# Patient Record
Sex: FEMALE | Race: WHITE | Employment: OTHER | URBAN - NONMETROPOLITAN AREA
[De-identification: names, ages, dates, MRNs, and addresses within clinical notes are randomized per-mention and may not be internally consistent; named-entity substitution may affect disease eponyms.]

---

## 2017-01-30 ENCOUNTER — DOCTOR'S OFFICE (OUTPATIENT)
Dept: URBAN - NONMETROPOLITAN AREA CLINIC 1 | Facility: CLINIC | Age: 67
Setting detail: OPHTHALMOLOGY
End: 2017-01-30
Payer: MEDICARE

## 2017-01-30 DIAGNOSIS — L71.8: ICD-10-CM

## 2017-01-30 DIAGNOSIS — H43.813: ICD-10-CM

## 2017-01-30 DIAGNOSIS — H01.004: ICD-10-CM

## 2017-01-30 DIAGNOSIS — H35.033: ICD-10-CM

## 2017-01-30 DIAGNOSIS — H01.005: ICD-10-CM

## 2017-01-30 DIAGNOSIS — H04.123: ICD-10-CM

## 2017-01-30 DIAGNOSIS — H01.002: ICD-10-CM

## 2017-01-30 DIAGNOSIS — H25.13: ICD-10-CM

## 2017-01-30 DIAGNOSIS — H01.001: ICD-10-CM

## 2017-01-30 DIAGNOSIS — E11.9: ICD-10-CM

## 2017-01-30 PROCEDURE — 92014 COMPRE OPH EXAM EST PT 1/>: CPT | Performed by: OPHTHALMOLOGY

## 2017-01-30 ASSESSMENT — REFRACTION_MANIFEST
OS_VA1: 20/
OD_VA1: 20/
OD_VA2: 20/
OS_VA2: 20/
OU_VA: 20/
OS_VA3: 20/
OD_VA1: 20/
OD_VA3: 20/
OU_VA: 20/
OS_VA1: 20/
OS_VA2: 20/
OD_VA3: 20/
OD_VA2: 20/
OS_VA2: 20/
OS_VA1: 20/
OD_VA1: 20/
OD_VA2: 20/
OS_VA3: 20/
OD_VA3: 20/
OS_VA3: 20/
OU_VA: 20/

## 2017-01-30 ASSESSMENT — REFRACTION_CURRENTRX
OS_CYLINDER: -0.75
OS_ADD: +2.50
OD_SPHERE: +1.50
OD_CYLINDER: -0.25
OD_OVR_VA: 20/
OS_OVR_VA: 20/
OS_AXIS: 4
OS_SPHERE: +1.00
OD_AXIS: 146
OD_ADD: +2.50
OS_VPRISM_DIRECTION: PROGS
OD_VPRISM_DIRECTION: PROGS
OS_OVR_VA: 20/
OS_OVR_VA: 20/

## 2017-01-30 ASSESSMENT — REFRACTION_AUTOREFRACTION
OS_CYLINDER: -1.00
OS_AXIS: 15
OD_SPHERE: +0.75
OS_SPHERE: +1.25
OD_CYLINDER: 0.00
OD_AXIS: 180

## 2017-01-30 ASSESSMENT — SUPERFICIAL PUNCTATE KERATITIS (SPK)
OD_SPK: 1+
OS_SPK: 1+

## 2017-01-30 ASSESSMENT — LID EXAM ASSESSMENTS
OD_BLEPHARITIS: T
OS_BLEPHARITIS: T
OS_MEIBOMITIS: 1+
OD_MEIBOMITIS: 1+

## 2017-01-30 ASSESSMENT — SPHEQUIV_DERIVED
OS_SPHEQUIV: 0.75
OD_SPHEQUIV: 0.75

## 2017-01-30 ASSESSMENT — VISUAL ACUITY
OS_BCVA: 20/30+2
OD_BCVA: 20/30

## 2017-01-30 ASSESSMENT — CONFRONTATIONAL VISUAL FIELD TEST (CVF)
OD_FINDINGS: FULL
OS_FINDINGS: FULL

## 2018-01-29 ENCOUNTER — DOCTOR'S OFFICE (OUTPATIENT)
Dept: URBAN - NONMETROPOLITAN AREA CLINIC 1 | Facility: CLINIC | Age: 68
Setting detail: OPHTHALMOLOGY
End: 2018-01-29
Payer: MEDICARE

## 2018-01-29 DIAGNOSIS — H04.122: ICD-10-CM

## 2018-01-29 DIAGNOSIS — H04.121: ICD-10-CM

## 2018-01-29 DIAGNOSIS — H43.811: ICD-10-CM

## 2018-01-29 DIAGNOSIS — H35.033: ICD-10-CM

## 2018-01-29 DIAGNOSIS — H43.812: ICD-10-CM

## 2018-01-29 DIAGNOSIS — H43.813: ICD-10-CM

## 2018-01-29 DIAGNOSIS — H04.123: ICD-10-CM

## 2018-01-29 DIAGNOSIS — H25.13: ICD-10-CM

## 2018-01-29 DIAGNOSIS — L71.8: ICD-10-CM

## 2018-01-29 PROCEDURE — 92014 COMPRE OPH EXAM EST PT 1/>: CPT | Performed by: OPHTHALMOLOGY

## 2018-01-29 PROCEDURE — 92225 OPHTHALMOSCOPY EXTENDED INITIAL: CPT | Performed by: OPHTHALMOLOGY

## 2018-01-29 ASSESSMENT — REFRACTION_CURRENTRX
OD_OVR_VA: 20/
OS_AXIS: 4
OS_OVR_VA: 20/
OS_OVR_VA: 20/
OD_OVR_VA: 20/
OS_VPRISM_DIRECTION: PROGS
OS_OVR_VA: 20/
OD_OVR_VA: 20/
OS_SPHERE: +1.00
OD_CYLINDER: -0.25
OD_VPRISM_DIRECTION: PROGS
OS_CYLINDER: -0.75
OS_ADD: +2.50
OD_ADD: +2.50
OD_SPHERE: +1.50
OD_AXIS: 146

## 2018-01-29 ASSESSMENT — REFRACTION_MANIFEST
OS_VA2: 20/
OU_VA: 20/
OS_VA3: 20/
OD_VA3: 20/
OD_VA2: 20/
OU_VA: 20/
OD_VA3: 20/
OS_VA1: 20/
OD_VA1: 20/
OS_VA2: 20/
OD_VA3: 20/
OD_VA1: 20/
OS_VA3: 20/
OU_VA: 20/
OS_VA1: 20/
OD_VA1: 20/
OS_VA3: 20/
OS_VA2: 20/
OD_VA2: 20/
OS_VA1: 20/
OD_VA2: 20/

## 2018-01-29 ASSESSMENT — REFRACTION_AUTOREFRACTION
OD_CYLINDER: 0.00
OD_SPHERE: +0.75
OS_CYLINDER: -1.00
OD_AXIS: 180
OS_AXIS: 15
OS_SPHERE: +1.25

## 2018-01-29 ASSESSMENT — CONFRONTATIONAL VISUAL FIELD TEST (CVF)
OD_FINDINGS: FULL
OS_FINDINGS: FULL

## 2018-01-29 ASSESSMENT — SUPERFICIAL PUNCTATE KERATITIS (SPK)
OS_SPK: ABSENT
OD_SPK: ABSENT

## 2018-01-29 ASSESSMENT — LID EXAM ASSESSMENTS
OD_MEIBOMITIS: 1+
OS_MEIBOMITIS: 1+
OD_BLEPHARITIS: T
OS_BLEPHARITIS: T

## 2018-01-29 ASSESSMENT — SPHEQUIV_DERIVED
OS_SPHEQUIV: 0.75
OD_SPHEQUIV: 0.75

## 2018-01-29 ASSESSMENT — VISUAL ACUITY
OS_BCVA: 20/25+2
OD_BCVA: 20/25-1

## 2019-01-21 ENCOUNTER — DOCTOR'S OFFICE (OUTPATIENT)
Dept: URBAN - NONMETROPOLITAN AREA CLINIC 1 | Facility: CLINIC | Age: 69
Setting detail: OPHTHALMOLOGY
End: 2019-01-21
Payer: MEDICARE

## 2019-01-21 DIAGNOSIS — H04.123: ICD-10-CM

## 2019-01-21 DIAGNOSIS — H43.813: ICD-10-CM

## 2019-01-21 DIAGNOSIS — H25.13: ICD-10-CM

## 2019-01-21 DIAGNOSIS — E11.9: ICD-10-CM

## 2019-01-21 DIAGNOSIS — L71.8: ICD-10-CM

## 2019-01-21 DIAGNOSIS — H04.122: ICD-10-CM

## 2019-01-21 PROCEDURE — 83861 MICROFLUID ANALY TEARS: CPT | Performed by: OPHTHALMOLOGY

## 2019-01-21 PROCEDURE — 92014 COMPRE OPH EXAM EST PT 1/>: CPT | Performed by: OPHTHALMOLOGY

## 2019-01-21 PROCEDURE — 92134 CPTRZ OPH DX IMG PST SGM RTA: CPT | Performed by: OPHTHALMOLOGY

## 2019-01-21 ASSESSMENT — LID EXAM ASSESSMENTS
OS_MEIBOMITIS: 1+
OD_MEIBOMITIS: 1+
OS_BLEPHARITIS: T
OD_BLEPHARITIS: T

## 2019-01-21 ASSESSMENT — CONFRONTATIONAL VISUAL FIELD TEST (CVF)
OS_FINDINGS: FULL
OD_FINDINGS: FULL

## 2019-01-21 ASSESSMENT — REFRACTION_AUTOREFRACTION
OS_SPHERE: +1.25
OD_AXIS: 180
OD_CYLINDER: 0.00
OS_AXIS: 15
OD_SPHERE: +0.75
OS_CYLINDER: -1.00

## 2019-01-21 ASSESSMENT — VISUAL ACUITY
OD_BCVA: 20/25+1
OS_BCVA: 20/20-1

## 2019-01-21 ASSESSMENT — REFRACTION_CURRENTRX
OS_OVR_VA: 20/
OD_VPRISM_DIRECTION: PROGS
OD_CYLINDER: -0.25
OS_ADD: +2.50
OS_VPRISM_DIRECTION: PROGS
OS_SPHERE: +1.00
OD_SPHERE: +1.50
OS_OVR_VA: 20/
OD_AXIS: 146
OS_OVR_VA: 20/
OS_AXIS: 4
OD_OVR_VA: 20/
OS_CYLINDER: -0.75
OD_OVR_VA: 20/
OD_OVR_VA: 20/
OD_ADD: +2.50

## 2019-01-21 ASSESSMENT — REFRACTION_MANIFEST
OU_VA: 20/
OS_VA1: 20/
OD_VA1: 20/
OU_VA: 20/
OS_VA3: 20/
OS_VA3: 20/
OD_VA2: 20/
OS_VA2: 20/
OD_VA3: 20/
OD_VA2: 20/
OD_VA1: 20/
OD_VA3: 20/
OS_VA2: 20/
OS_VA1: 20/

## 2019-01-21 ASSESSMENT — SPHEQUIV_DERIVED
OS_SPHEQUIV: 0.75
OD_SPHEQUIV: 0.75

## 2019-01-21 ASSESSMENT — SUPERFICIAL PUNCTATE KERATITIS (SPK)
OD_SPK: ABSENT
OS_SPK: ABSENT

## 2019-02-21 ENCOUNTER — DOCTOR'S OFFICE (OUTPATIENT)
Dept: URBAN - NONMETROPOLITAN AREA CLINIC 1 | Facility: CLINIC | Age: 69
Setting detail: OPHTHALMOLOGY
End: 2019-02-21
Payer: MEDICARE

## 2019-02-21 DIAGNOSIS — H10.13: ICD-10-CM

## 2019-02-21 DIAGNOSIS — L71.8: ICD-10-CM

## 2019-02-21 DIAGNOSIS — H04.123: ICD-10-CM

## 2019-02-21 DIAGNOSIS — H04.122: ICD-10-CM

## 2019-02-21 PROCEDURE — 83861 MICROFLUID ANALY TEARS: CPT | Performed by: OPHTHALMOLOGY

## 2019-02-21 PROCEDURE — 92012 INTRM OPH EXAM EST PATIENT: CPT | Performed by: OPHTHALMOLOGY

## 2019-02-21 ASSESSMENT — REFRACTION_CURRENTRX
OD_OVR_VA: 20/
OD_AXIS: 146
OS_ADD: +2.50
OD_ADD: +2.50
OD_CYLINDER: -0.25
OS_SPHERE: +1.00
OS_OVR_VA: 20/
OD_OVR_VA: 20/
OS_VPRISM_DIRECTION: PROGS
OD_SPHERE: +1.50
OS_AXIS: 4
OS_OVR_VA: 20/
OS_CYLINDER: -0.75
OS_OVR_VA: 20/
OD_VPRISM_DIRECTION: PROGS
OD_OVR_VA: 20/

## 2019-02-21 ASSESSMENT — REFRACTION_AUTOREFRACTION
OS_CYLINDER: -1.00
OD_AXIS: 180
OD_CYLINDER: 0.00
OD_SPHERE: +0.75
OS_SPHERE: +1.25
OS_AXIS: 15

## 2019-02-21 ASSESSMENT — LID EXAM ASSESSMENTS
OS_MEIBOMITIS: 1+
OS_BLEPHARITIS: T
OD_MEIBOMITIS: 1+
OD_BLEPHARITIS: T

## 2019-02-21 ASSESSMENT — REFRACTION_MANIFEST
OD_VA3: 20/
OU_VA: 20/
OS_VA3: 20/
OU_VA: 20/
OD_VA2: 20/
OD_VA1: 20/
OS_VA1: 20/
OD_VA3: 20/
OS_VA2: 20/
OS_VA1: 20/
OD_VA2: 20/
OS_VA2: 20/
OS_VA3: 20/
OD_VA1: 20/

## 2019-02-21 ASSESSMENT — CONFRONTATIONAL VISUAL FIELD TEST (CVF)
OS_FINDINGS: FULL
OD_FINDINGS: FULL

## 2019-02-21 ASSESSMENT — SUPERFICIAL PUNCTATE KERATITIS (SPK)
OS_SPK: ABSENT
OD_SPK: ABSENT

## 2019-02-21 ASSESSMENT — VISUAL ACUITY
OD_BCVA: 20/25-2
OS_BCVA: 20/20-1

## 2019-02-21 ASSESSMENT — SPHEQUIV_DERIVED
OS_SPHEQUIV: 0.75
OD_SPHEQUIV: 0.75

## 2019-03-19 ENCOUNTER — DOCTOR'S OFFICE (OUTPATIENT)
Dept: URBAN - NONMETROPOLITAN AREA CLINIC 1 | Facility: CLINIC | Age: 69
Setting detail: OPHTHALMOLOGY
End: 2019-03-19
Payer: MEDICARE

## 2019-03-19 DIAGNOSIS — H10.13: ICD-10-CM

## 2019-03-19 PROCEDURE — 95004 PERQ TESTS W/ALRGNC XTRCS: CPT | Performed by: OPHTHALMOLOGY

## 2019-03-28 ENCOUNTER — DOCTOR'S OFFICE (OUTPATIENT)
Dept: URBAN - NONMETROPOLITAN AREA CLINIC 1 | Facility: CLINIC | Age: 69
Setting detail: OPHTHALMOLOGY
End: 2019-03-28
Payer: MEDICARE

## 2019-03-28 DIAGNOSIS — H43.813: ICD-10-CM

## 2019-03-28 DIAGNOSIS — H04.123: ICD-10-CM

## 2019-03-28 DIAGNOSIS — H35.033: ICD-10-CM

## 2019-03-28 DIAGNOSIS — H25.13: ICD-10-CM

## 2019-03-28 DIAGNOSIS — E11.9: ICD-10-CM

## 2019-03-28 DIAGNOSIS — H04.122: ICD-10-CM

## 2019-03-28 PROCEDURE — 92014 COMPRE OPH EXAM EST PT 1/>: CPT | Performed by: OPHTHALMOLOGY

## 2019-03-28 PROCEDURE — 92134 CPTRZ OPH DX IMG PST SGM RTA: CPT | Performed by: OPHTHALMOLOGY

## 2019-03-28 PROCEDURE — 83861 MICROFLUID ANALY TEARS: CPT | Performed by: OPHTHALMOLOGY

## 2019-03-28 ASSESSMENT — REFRACTION_MANIFEST
OS_VA2: 20/
OD_VA2: 20/
OS_VA3: 20/
OD_VA3: 20/
OD_VA3: 20/
OS_VA1: 20/
OD_VA2: 20/
OS_VA1: 20/
OS_VA2: 20/
OS_VA3: 20/
OU_VA: 20/
OD_VA1: 20/
OD_VA1: 20/
OU_VA: 20/

## 2019-03-28 ASSESSMENT — LID EXAM ASSESSMENTS
OD_MEIBOMITIS: 1+
OS_MEIBOMITIS: 1+
OS_BLEPHARITIS: T
OD_BLEPHARITIS: T

## 2019-03-28 ASSESSMENT — REFRACTION_CURRENTRX
OD_AXIS: 146
OD_CYLINDER: -0.25
OS_AXIS: 4
OD_OVR_VA: 20/
OD_OVR_VA: 20/
OD_ADD: +2.50
OD_SPHERE: +1.50
OS_SPHERE: +1.00
OS_VPRISM_DIRECTION: PROGS
OD_VPRISM_DIRECTION: PROGS
OD_OVR_VA: 20/
OS_CYLINDER: -0.75
OS_OVR_VA: 20/
OS_OVR_VA: 20/
OS_ADD: +2.50
OS_OVR_VA: 20/

## 2019-03-28 ASSESSMENT — REFRACTION_AUTOREFRACTION
OD_AXIS: 180
OS_SPHERE: +1.25
OD_CYLINDER: 0.00
OS_CYLINDER: -1.00
OS_AXIS: 15
OD_SPHERE: +0.75

## 2019-03-28 ASSESSMENT — VISUAL ACUITY
OS_BCVA: 20/20-1
OD_BCVA: 20/25-2

## 2019-03-28 ASSESSMENT — SUPERFICIAL PUNCTATE KERATITIS (SPK)
OS_SPK: ABSENT
OD_SPK: ABSENT

## 2019-03-28 ASSESSMENT — CONFRONTATIONAL VISUAL FIELD TEST (CVF)
OS_FINDINGS: FULL
OD_FINDINGS: FULL

## 2019-03-28 ASSESSMENT — SPHEQUIV_DERIVED
OS_SPHEQUIV: 0.75
OD_SPHEQUIV: 0.75

## 2019-05-23 ENCOUNTER — DOCTOR'S OFFICE (OUTPATIENT)
Dept: URBAN - NONMETROPOLITAN AREA CLINIC 1 | Facility: CLINIC | Age: 69
Setting detail: OPHTHALMOLOGY
End: 2019-05-23
Payer: MEDICARE

## 2019-05-23 DIAGNOSIS — H43.811: ICD-10-CM

## 2019-05-23 DIAGNOSIS — H35.033: ICD-10-CM

## 2019-05-23 DIAGNOSIS — H43.812: ICD-10-CM

## 2019-05-23 DIAGNOSIS — H43.813: ICD-10-CM

## 2019-05-23 DIAGNOSIS — H25.13: ICD-10-CM

## 2019-05-23 DIAGNOSIS — H04.122: ICD-10-CM

## 2019-05-23 DIAGNOSIS — H04.121: ICD-10-CM

## 2019-05-23 DIAGNOSIS — H04.123: ICD-10-CM

## 2019-05-23 PROCEDURE — 92134 CPTRZ OPH DX IMG PST SGM RTA: CPT | Performed by: OPHTHALMOLOGY

## 2019-05-23 PROCEDURE — 83861 MICROFLUID ANALY TEARS: CPT | Performed by: OPHTHALMOLOGY

## 2019-05-23 PROCEDURE — 92226 OPHTHALMOSCOPY EXT SUBSEQUENT: CPT | Performed by: OPHTHALMOLOGY

## 2019-05-23 PROCEDURE — 92014 COMPRE OPH EXAM EST PT 1/>: CPT | Performed by: OPHTHALMOLOGY

## 2019-05-23 ASSESSMENT — REFRACTION_MANIFEST
OD_VA3: 20/
OS_VA3: 20/
OS_VA1: 20/
OD_VA2: 20/
OD_VA1: 20/
OD_VA1: 20/
OS_VA1: 20/
OS_VA2: 20/
OS_VA2: 20/
OS_VA3: 20/
OD_VA3: 20/
OU_VA: 20/
OD_VA2: 20/
OU_VA: 20/

## 2019-05-23 ASSESSMENT — REFRACTION_CURRENTRX
OS_OVR_VA: 20/
OD_OVR_VA: 20/
OS_SPHERE: +1.00
OD_OVR_VA: 20/
OD_CYLINDER: -0.25
OD_ADD: +2.50
OD_VPRISM_DIRECTION: PROGS
OS_OVR_VA: 20/
OS_AXIS: 4
OD_AXIS: 146
OD_SPHERE: +1.50
OS_CYLINDER: -0.75
OS_VPRISM_DIRECTION: PROGS
OD_OVR_VA: 20/
OS_OVR_VA: 20/
OS_ADD: +2.50

## 2019-05-23 ASSESSMENT — SUPERFICIAL PUNCTATE KERATITIS (SPK)
OD_SPK: ABSENT
OS_SPK: ABSENT

## 2019-05-23 ASSESSMENT — REFRACTION_AUTOREFRACTION
OS_CYLINDER: -1.00
OS_SPHERE: +1.25
OS_AXIS: 15
OD_SPHERE: +0.75
OD_CYLINDER: 0.00
OD_AXIS: 180

## 2019-05-23 ASSESSMENT — LID EXAM ASSESSMENTS
OS_MEIBOMITIS: 1+
OD_MEIBOMITIS: 1+
OD_BLEPHARITIS: T
OS_BLEPHARITIS: T

## 2019-05-23 ASSESSMENT — CONFRONTATIONAL VISUAL FIELD TEST (CVF)
OS_FINDINGS: FULL
OD_FINDINGS: FULL

## 2019-05-23 ASSESSMENT — VISUAL ACUITY
OS_BCVA: 20/20-1
OD_BCVA: 20/25

## 2019-05-23 ASSESSMENT — SPHEQUIV_DERIVED
OS_SPHEQUIV: 0.75
OD_SPHEQUIV: 0.75

## 2019-09-27 ENCOUNTER — DOCTOR'S OFFICE (OUTPATIENT)
Dept: URBAN - NONMETROPOLITAN AREA CLINIC 1 | Facility: CLINIC | Age: 69
Setting detail: OPHTHALMOLOGY
End: 2019-09-27
Payer: MEDICARE

## 2019-09-27 DIAGNOSIS — E11.9: ICD-10-CM

## 2019-09-27 DIAGNOSIS — H25.13: ICD-10-CM

## 2019-09-27 DIAGNOSIS — H04.122: ICD-10-CM

## 2019-09-27 DIAGNOSIS — H35.033: ICD-10-CM

## 2019-09-27 DIAGNOSIS — H04.123: ICD-10-CM

## 2019-09-27 PROCEDURE — 92012 INTRM OPH EXAM EST PATIENT: CPT | Performed by: OPHTHALMOLOGY

## 2019-09-27 PROCEDURE — 83861 MICROFLUID ANALY TEARS: CPT | Performed by: OPHTHALMOLOGY

## 2019-09-27 ASSESSMENT — REFRACTION_CURRENTRX
OD_CYLINDER: -0.25
OS_OVR_VA: 20/
OS_OVR_VA: 20/
OD_OVR_VA: 20/
OS_CYLINDER: -0.75
OS_AXIS: 4
OS_SPHERE: +1.00
OD_ADD: +2.50
OS_VPRISM_DIRECTION: PROGS
OS_OVR_VA: 20/
OD_AXIS: 146
OD_SPHERE: +1.50
OD_OVR_VA: 20/
OD_OVR_VA: 20/
OS_ADD: +2.50
OD_VPRISM_DIRECTION: PROGS

## 2019-09-27 ASSESSMENT — SPHEQUIV_DERIVED
OS_SPHEQUIV: 0.625
OD_SPHEQUIV: 0.875

## 2019-09-27 ASSESSMENT — SUPERFICIAL PUNCTATE KERATITIS (SPK)
OS_SPK: ABSENT
OD_SPK: ABSENT

## 2019-09-27 ASSESSMENT — REFRACTION_AUTOREFRACTION
OS_SPHERE: +1.25
OD_SPHERE: +1.00
OS_CYLINDER: -1.25
OD_CYLINDER: -0.25
OD_AXIS: 1
OS_AXIS: 8

## 2019-09-27 ASSESSMENT — REFRACTION_MANIFEST
OS_VA2: 20/
OD_VA2: 20/
OS_VA2: 20/
OS_VA3: 20/
OD_VA1: 20/
OD_VA3: 20/
OU_VA: 20/
OD_VA1: 20/
OS_VA1: 20/
OD_VA2: 20/
OD_VA3: 20/
OS_VA1: 20/
OU_VA: 20/
OS_VA3: 20/

## 2019-09-27 ASSESSMENT — LID EXAM ASSESSMENTS
OS_BLEPHARITIS: T
OD_MEIBOMITIS: 1+
OS_MEIBOMITIS: 1+
OD_BLEPHARITIS: T

## 2019-09-27 ASSESSMENT — CONFRONTATIONAL VISUAL FIELD TEST (CVF)
OD_FINDINGS: FULL
OS_FINDINGS: FULL

## 2019-09-27 ASSESSMENT — VISUAL ACUITY
OS_BCVA: 20/20
OD_BCVA: 20/25+2

## 2020-06-26 ENCOUNTER — DOCTOR'S OFFICE (OUTPATIENT)
Dept: URBAN - NONMETROPOLITAN AREA CLINIC 1 | Facility: CLINIC | Age: 70
Setting detail: OPHTHALMOLOGY
End: 2020-06-26
Payer: MEDICARE

## 2020-06-26 DIAGNOSIS — H43.813: ICD-10-CM

## 2020-06-26 DIAGNOSIS — H04.121: ICD-10-CM

## 2020-06-26 DIAGNOSIS — H25.13: ICD-10-CM

## 2020-06-26 DIAGNOSIS — H04.122: ICD-10-CM

## 2020-06-26 DIAGNOSIS — H35.033: ICD-10-CM

## 2020-06-26 DIAGNOSIS — E11.9: ICD-10-CM

## 2020-06-26 PROCEDURE — 92014 COMPRE OPH EXAM EST PT 1/>: CPT | Performed by: OPHTHALMOLOGY

## 2020-06-26 PROCEDURE — 92134 CPTRZ OPH DX IMG PST SGM RTA: CPT | Performed by: OPHTHALMOLOGY

## 2020-06-26 PROCEDURE — 83861 MICROFLUID ANALY TEARS: CPT | Performed by: OPHTHALMOLOGY

## 2020-06-26 ASSESSMENT — REFRACTION_AUTOREFRACTION
OS_SPHERE: +2.00
OD_SPHERE: +1.25
OD_CYLINDER: 0.00
OS_AXIS: 019
OS_CYLINDER: -1.75

## 2020-06-26 ASSESSMENT — REFRACTION_CURRENTRX
OS_SPHERE: +1.00
OS_AXIS: 4
OD_AXIS: 146
OD_VPRISM_DIRECTION: PROGS
OS_OVR_VA: 20/
OS_CYLINDER: -0.75
OS_ADD: +2.50
OS_VPRISM_DIRECTION: PROGS
OD_OVR_VA: 20/
OD_CYLINDER: -0.25
OD_ADD: +2.50
OD_SPHERE: +1.50

## 2020-06-26 ASSESSMENT — CONFRONTATIONAL VISUAL FIELD TEST (CVF)
OS_FINDINGS: FULL
OD_FINDINGS: FULL

## 2020-06-26 ASSESSMENT — VISUAL ACUITY
OD_BCVA: 20/30-1
OS_BCVA: 20/25-1

## 2020-06-26 ASSESSMENT — SPHEQUIV_DERIVED
OD_SPHEQUIV: 1.25
OS_SPHEQUIV: 1.125

## 2020-06-26 ASSESSMENT — SUPERFICIAL PUNCTATE KERATITIS (SPK)
OS_SPK: 1+
OD_SPK: 1+

## 2021-04-08 DIAGNOSIS — Z23 ENCOUNTER FOR IMMUNIZATION: ICD-10-CM

## 2021-08-27 ENCOUNTER — DOCTOR'S OFFICE (OUTPATIENT)
Dept: URBAN - NONMETROPOLITAN AREA CLINIC 1 | Facility: CLINIC | Age: 71
Setting detail: OPHTHALMOLOGY
End: 2021-08-27
Payer: MEDICARE

## 2021-08-27 DIAGNOSIS — H04.122: ICD-10-CM

## 2021-08-27 DIAGNOSIS — H25.13: ICD-10-CM

## 2021-08-27 DIAGNOSIS — E11.9: ICD-10-CM

## 2021-08-27 DIAGNOSIS — H43.813: ICD-10-CM

## 2021-08-27 DIAGNOSIS — H04.123: ICD-10-CM

## 2021-08-27 DIAGNOSIS — H35.033: ICD-10-CM

## 2021-08-27 PROBLEM — H01.004 BLEPHARITIS; RIGHT UPPER LID, LEFT UPPER LID: Status: ACTIVE | Noted: 2018-01-29

## 2021-08-27 PROBLEM — L71.8: Status: ACTIVE | Noted: 2017-01-30

## 2021-08-27 PROBLEM — H10.13 ALLERGIC CONJUNCTIVITS; BOTH EYES: Status: ACTIVE | Noted: 2019-02-21

## 2021-08-27 PROBLEM — H04.121 DRY EYE; RIGHT EYE, LEFT EYE: Status: ACTIVE | Noted: 2018-01-29

## 2021-08-27 PROBLEM — H01.001 BLEPHARITIS; RIGHT UPPER LID, LEFT UPPER LID: Status: ACTIVE | Noted: 2018-01-29

## 2021-08-27 PROCEDURE — 83861 MICROFLUID ANALY TEARS: CPT | Performed by: OPHTHALMOLOGY

## 2021-08-27 PROCEDURE — 92134 CPTRZ OPH DX IMG PST SGM RTA: CPT | Performed by: OPHTHALMOLOGY

## 2021-08-27 PROCEDURE — 92014 COMPRE OPH EXAM EST PT 1/>: CPT | Performed by: OPHTHALMOLOGY

## 2021-08-27 ASSESSMENT — REFRACTION_AUTOREFRACTION
OD_SPHERE: +1.25
OS_SPHERE: +2.00
OS_CYLINDER: -1.75
OS_AXIS: 019
OD_CYLINDER: 0.00

## 2021-08-27 ASSESSMENT — REFRACTION_CURRENTRX
OD_VPRISM_DIRECTION: PROGS
OS_VPRISM_DIRECTION: PROGS
OS_AXIS: 4
OS_SPHERE: +1.00
OD_ADD: +2.50
OD_AXIS: 146
OD_CYLINDER: -0.25
OD_SPHERE: +1.50
OS_ADD: +2.50
OD_OVR_VA: 20/
OS_CYLINDER: -0.75
OS_OVR_VA: 20/

## 2021-08-27 ASSESSMENT — CONFRONTATIONAL VISUAL FIELD TEST (CVF)
OS_FINDINGS: FULL
OD_FINDINGS: FULL

## 2021-08-27 ASSESSMENT — SUPERFICIAL PUNCTATE KERATITIS (SPK)
OD_SPK: ABSENT
OS_SPK: ABSENT

## 2021-08-27 ASSESSMENT — SPHEQUIV_DERIVED
OS_SPHEQUIV: 1.125
OD_SPHEQUIV: 1.25

## 2021-08-27 ASSESSMENT — VISUAL ACUITY
OD_BCVA: 20/30-2
OS_BCVA: 20/25-2

## 2022-08-18 ENCOUNTER — HOSPITAL ENCOUNTER (EMERGENCY)
Facility: HOSPITAL | Age: 72
Discharge: HOME | End: 2022-08-18
Attending: EMERGENCY MEDICINE | Admitting: EMERGENCY MEDICINE
Payer: COMMERCIAL

## 2022-08-18 VITALS
TEMPERATURE: 97.2 F | SYSTOLIC BLOOD PRESSURE: 116 MMHG | HEART RATE: 117 BPM | OXYGEN SATURATION: 96 % | DIASTOLIC BLOOD PRESSURE: 70 MMHG | RESPIRATION RATE: 18 BRPM

## 2022-08-18 DIAGNOSIS — L50.9 HIVES: ICD-10-CM

## 2022-08-18 DIAGNOSIS — T78.40XA ALLERGIC REACTION, INITIAL ENCOUNTER: Primary | ICD-10-CM

## 2022-08-18 PROCEDURE — 99284 EMERGENCY DEPT VISIT MOD MDM: CPT | Performed by: EMERGENCY MEDICINE

## 2022-08-18 PROCEDURE — 99282 EMERGENCY DEPT VISIT SF MDM: CPT

## 2022-08-18 RX ORDER — DIPHENHYDRAMINE HCL 25 MG
50 TABLET ORAL ONCE
Status: COMPLETED | OUTPATIENT
Start: 2022-08-18 | End: 2022-08-18

## 2022-08-18 RX ORDER — FAMOTIDINE 20 MG/1
20 TABLET, FILM COATED ORAL 2 TIMES DAILY
Qty: 10 TABLET | Refills: 0 | Status: SHIPPED | OUTPATIENT
Start: 2022-08-18 | End: 2022-08-23

## 2022-08-18 RX ORDER — FAMOTIDINE 20 MG/1
20 TABLET, FILM COATED ORAL ONCE
Status: COMPLETED | OUTPATIENT
Start: 2022-08-18 | End: 2022-08-18

## 2022-08-18 RX ORDER — DIPHENHYDRAMINE HCL 25 MG
25 TABLET ORAL EVERY 6 HOURS
Qty: 20 TABLET | Refills: 0 | Status: SHIPPED | OUTPATIENT
Start: 2022-08-18 | End: 2022-08-23

## 2022-08-18 RX ORDER — EPINEPHRINE 0.3 MG/.3ML
0.3 INJECTION SUBCUTANEOUS ONCE
Qty: 0.6 ML | Refills: 0 | Status: SHIPPED | OUTPATIENT
Start: 2022-08-18 | End: 2022-08-18

## 2022-08-18 RX ORDER — PREDNISONE 20 MG/1
40 TABLET ORAL DAILY
Qty: 8 TABLET | Refills: 0 | Status: SHIPPED | OUTPATIENT
Start: 2022-08-19 | End: 2022-08-23

## 2022-08-18 RX ADMIN — FAMOTIDINE 20 MG: 20 TABLET ORAL at 06:30

## 2022-08-18 RX ADMIN — DIPHENHYDRAMINE HYDROCHLORIDE 50 MG: 25 TABLET ORAL at 06:30

## 2022-08-18 RX ADMIN — PREDNISONE 50 MG: 10 TABLET ORAL at 06:30

## 2022-08-18 NOTE — DISCHARGE INSTRUCTIONS
Thank you for letting us take care of you  You have been evaluated for a possible allergic reaction  Please use the medications prescribed  Please follow-up with an allergist   Please avoid anything that may have caused this reaction in the 1st place  Please return if your worsening symptoms as discussed  At this time, you have no clinical evidence of symptoms or problems that will require hospitalization, however you should be evaluated soon by a primary care physician, and contact information has been provided  Follow up with your primary care physician  This is important as many medical conditions can be managed as an outpatient, in addition to routine health screening  Seeing your primary doctor often can help identify changes in the medical issue that brought you to the ED for care today  If you experience any new symptoms or acute worsening of current symptoms, please return to the ED

## 2022-08-18 NOTE — ED PROVIDER NOTES
History  Chief Complaint   Patient presents with    Itching     Patient started with hives last evening around 7pm  Patient denies sob or difficulty swallowing  67-year-old female with past medical history pertinent for allergies to minocycline, shellfish, sulfa who presents to the emergency department for evaluation of hives that started around 7:00 p m  Last night  Denies any shortness of breath, difficulty swallowing or difficulty breathing  Denies any nausea, vomiting  Reports rash has been pruritic and is located mostly to the torso anteriorly  Also reports that she took Benadryl at 10:00 p m  Last night without much relief of her itching  States that she is not sure she had any new exposures but did the some noodles yesterday that were different from usual   Otherwise does not have any new creams or exposures that she knows of  States that her son is a pharmacist and advised that she come get checked  No other concerns today  None       Past Medical History:   Diagnosis Date    Diabetes mellitus ([facility] [address] )     Disease of thyroid gland     Hypertension        Past Surgical History:   Procedure Laterality Date    HYSTERECTOMY         History reviewed  No pertinent family history  I have reviewed and agree with the history as documented  E-Cigarette/Vaping    E-Cigarette Use Never User      E-Cigarette/Vaping Substances     Social History     Tobacco Use    Smoking status: Never Smoker    Smokeless tobacco: Never Used   Vaping Use    Vaping Use: Never used   Substance Use Topics    Alcohol use: Not Currently    Drug use: Not Currently       Review of Systems   Constitutional: Negative for activity change, appetite change, chills and fever  HENT: Negative for congestion, rhinorrhea and sore throat  Eyes: Negative for visual disturbance  Respiratory: Negative for chest tightness, shortness of breath and wheezing      Cardiovascular: Negative for chest pain, palpitations and leg swelling  Gastrointestinal: Negative for abdominal pain, constipation, diarrhea, nausea and vomiting  Genitourinary: Negative for dysuria, flank pain and pelvic pain  Musculoskeletal: Negative for back pain and neck pain  Skin: Positive for color change and rash  Neurological: Negative for weakness, numbness and headaches  Psychiatric/Behavioral: Negative for behavioral problems  Physical Exam  Physical Exam  Vitals and nursing note reviewed  Constitutional:       General: She is not in acute distress  Appearance: She is well-developed  She is not diaphoretic  HENT:      Head: Normocephalic and atraumatic  Right Ear: External ear normal       Left Ear: External ear normal       Nose: Nose normal       Mouth/Throat:      Mouth: Mucous membranes are moist       Comments: Mallampati score of 1  Eyes:      Pupils: Pupils are equal, round, and reactive to light  Cardiovascular:      Rate and Rhythm: Normal rate and regular rhythm  Heart sounds: Normal heart sounds  Pulmonary:      Effort: Pulmonary effort is normal  No respiratory distress  Breath sounds: Normal breath sounds  No wheezing or rales  Abdominal:      General: Bowel sounds are normal       Palpations: Abdomen is soft  Tenderness: There is no abdominal tenderness  Musculoskeletal:         General: No tenderness or deformity  Normal range of motion  Cervical back: Normal range of motion and neck supple  Skin:     General: Skin is warm and dry  Capillary Refill: Capillary refill takes less than 2 seconds  Findings: Erythema and rash present  Comments: Maculopapular rash over the abdomen, flanks consistent with hives; see images below   Neurological:      Mental Status: She is alert and oriented to person, place, and time  Motor: No abnormal muscle tone                     Vital Signs  ED Triage Vitals [08/18/22 0549]   Temperature Pulse Respirations Blood Pressure SpO2   (!) 97 2 °F (36 2 °C) (!) 117 18 116/70 96 %      Temp Source Heart Rate Source Patient Position - Orthostatic VS BP Location FiO2 (%)   Temporal Monitor Sitting Left arm --      Pain Score       --           Vitals:    08/18/22 0549 08/18/22 0600   BP: 116/70 116/70   Pulse: (!) 117    Patient Position - Orthostatic VS: Sitting          Visual Acuity      ED Medications  Medications   predniSONE tablet 50 mg (50 mg Oral Given 8/18/22 0630)   diphenhydrAMINE (BENADRYL) tablet 50 mg (50 mg Oral Given 8/18/22 0630)   famotidine (PEPCID) tablet 20 mg (20 mg Oral Given 8/18/22 0630)       Diagnostic Studies  Results Reviewed     None                 No orders to display              Procedures  Procedures         ED Course             MDM  Number of Diagnoses or Management Options  Allergic reaction, initial encounter  Hives  Diagnosis management comments: 41-year-old female with past medical history pertinent for allergies to minocycline, shellfish, sulfa who presents to the emergency department for evaluation of hives that started around 7:00 p m  Last night  Vital signs on arrival here were notable for mild tachycardia with heart rate in the 110s  Otherwise vital signs are non concerning  Patient has hives over her torso as noted above  Unknown source at this time  Patient was treated with prednisone, Benadryl and Pepcid here  Patient prescribed the same for home use and was given a prescription for an EpiPen  Allergist referral placed for follow-up and patient was counseled to avoid any potential sources  Patient was also did counseled on signs of anaphylaxis and return precautions given  Patient agreeable to outpatient care  Amount and/or Complexity of Data Reviewed  Review and summarize past medical records: yes    Risk of Complications, Morbidity, and/or Mortality  Presenting problems: moderate  Diagnostic procedures: moderate  Management options: moderate        Disposition  Final diagnoses:    Allergic reaction, initial encounter   Hives     Time reflects when diagnosis was documented in both MDM as applicable and the Disposition within this note     Time User Action Codes Description Comment    8/18/2022  6:25 AM Lg Kacijuila MERYL Add [T78 40XA] Allergic reaction, initial encounter     8/18/2022  6:27 AM Lg SHETH Modify [T78 40XA] Allergic reaction, initial encounter     8/18/2022  6:27 AM Olive Shah Add [L50 9] Hives       ED Disposition     ED Disposition   Discharge    Condition   Stable    Date/Time   Thu Aug 18, 2022  6:25 AM    Comment   Nj Dang discharge to home/self care                 Follow-up Information     Follow up With Specialties Details Why Contact Info    An Allergist              Patient's Medications   Discharge Prescriptions    DIPHENHYDRAMINE (BENADRYL) 25 MG TABLET    Take 1 tablet (25 mg total) by mouth every 6 (six) hours for 5 days       Start Date: 8/18/2022 End Date: 8/23/2022       Order Dose: 25 mg       Quantity: 20 tablet    Refills: 0    EPINEPHRINE (EPIPEN) 0 3 MG/0 3 ML SOAJ    Inject 0 3 mL (0 3 mg total) into a muscle once for 1 dose       Start Date: 8/18/2022 End Date: 8/18/2022       Order Dose: 0 3 mg       Quantity: 0 6 mL    Refills: 0    FAMOTIDINE (PEPCID) 20 MG TABLET    Take 1 tablet (20 mg total) by mouth 2 (two) times a day for 5 days       Start Date: 8/18/2022 End Date: 8/23/2022       Order Dose: 20 mg       Quantity: 10 tablet    Refills: 0    PREDNISONE 20 MG TABLET    Take 2 tablets (40 mg total) by mouth daily for 4 days       Start Date: 8/19/2022 End Date: 8/23/2022       Order Dose: 40 mg       Quantity: 8 tablet    Refills: 0           PDMP Review     None          ED Provider  Electronically Signed by           Micheline Gaucher, MD  08/18/22 2731